# Patient Record
Sex: FEMALE | Race: WHITE
[De-identification: names, ages, dates, MRNs, and addresses within clinical notes are randomized per-mention and may not be internally consistent; named-entity substitution may affect disease eponyms.]

---

## 2017-01-20 ENCOUNTER — HOSPITAL ENCOUNTER (OUTPATIENT)
Dept: HOSPITAL 44 - LAB | Age: 42
End: 2017-01-20
Attending: NURSE PRACTITIONER
Payer: COMMERCIAL

## 2017-01-20 DIAGNOSIS — Z51.81: Primary | ICD-10-CM

## 2017-01-20 DIAGNOSIS — Z79.899: ICD-10-CM

## 2017-01-20 LAB
BASOPHILS NFR BLD: 0.2 % (ref 0–1.5)
EGFR (AFRICAN): > 60
EGFR (NON-AFRICAN): > 60
EOSINOPHIL NFR BLD: 1.1 % (ref 0–6.8)
LYMPHOCYTES # BLD AUTO: 1.2 # K/UL (ref 0.6–4)
MCH RBC QN AUTO: 32.7 PG (ref 28–34)
MONOCYTES #: 0.3 # K/UL (ref 0–0.9)
MONOCYTES %: 7.4 % (ref 0–11)
NEUTROPHILS #: 2.3 # K/UL (ref 1.4–7.7)

## 2017-01-20 PROCEDURE — 80164 ASSAY DIPROPYLACETIC ACD TOT: CPT

## 2017-01-20 PROCEDURE — 85025 COMPLETE CBC W/AUTO DIFF WBC: CPT

## 2017-01-20 PROCEDURE — 80053 COMPREHEN METABOLIC PANEL: CPT

## 2017-01-20 PROCEDURE — 80061 LIPID PANEL: CPT

## 2017-01-20 PROCEDURE — 36415 COLL VENOUS BLD VENIPUNCTURE: CPT

## 2017-02-10 ENCOUNTER — HOSPITAL ENCOUNTER (OUTPATIENT)
Dept: HOSPITAL 44 - LAB | Age: 42
End: 2017-02-10
Attending: NURSE PRACTITIONER
Payer: COMMERCIAL

## 2017-02-10 DIAGNOSIS — Z79.899: ICD-10-CM

## 2017-02-10 DIAGNOSIS — Z51.81: Primary | ICD-10-CM

## 2017-02-10 LAB — VIT B12 SERPL-MCNC: 607 PG/ML (ref 211–946)

## 2017-02-10 PROCEDURE — 36415 COLL VENOUS BLD VENIPUNCTURE: CPT

## 2017-02-10 PROCEDURE — 82746 ASSAY OF FOLIC ACID SERUM: CPT

## 2017-02-10 PROCEDURE — 82608 B-12 BINDING CAPACITY: CPT

## 2017-02-10 PROCEDURE — 84443 ASSAY THYROID STIM HORMONE: CPT

## 2017-03-10 ENCOUNTER — HOSPITAL ENCOUNTER (OUTPATIENT)
Dept: HOSPITAL 44 - LAB | Age: 42
End: 2017-03-10
Attending: NURSE PRACTITIONER
Payer: COMMERCIAL

## 2017-03-10 DIAGNOSIS — Z51.81: Primary | ICD-10-CM

## 2017-03-10 DIAGNOSIS — Z79.899: ICD-10-CM

## 2017-03-10 PROCEDURE — 80171 DRUG SCREEN QUANT GABAPENTIN: CPT

## 2017-03-10 PROCEDURE — 36415 COLL VENOUS BLD VENIPUNCTURE: CPT

## 2017-03-10 PROCEDURE — 82306 VITAMIN D 25 HYDROXY: CPT

## 2017-04-28 ENCOUNTER — HOSPITAL ENCOUNTER (OUTPATIENT)
Dept: HOSPITAL 44 - POD | Age: 42
End: 2017-04-28
Attending: PODIATRIST
Payer: COMMERCIAL

## 2017-04-28 DIAGNOSIS — M79.674: ICD-10-CM

## 2017-04-28 DIAGNOSIS — B35.1: Primary | ICD-10-CM

## 2017-04-28 DIAGNOSIS — M79.675: ICD-10-CM

## 2017-04-28 PROCEDURE — 11721 DEBRIDE NAIL 6 OR MORE: CPT

## 2017-07-14 ENCOUNTER — HOSPITAL ENCOUNTER (OUTPATIENT)
Dept: HOSPITAL 44 - LAB | Age: 42
End: 2017-07-14
Attending: NURSE PRACTITIONER
Payer: COMMERCIAL

## 2017-07-14 DIAGNOSIS — Z51.81: Primary | ICD-10-CM

## 2017-07-14 LAB
EGFR (AFRICAN): > 60
EGFR (NON-AFRICAN): > 60

## 2017-07-14 PROCEDURE — 36415 COLL VENOUS BLD VENIPUNCTURE: CPT

## 2017-07-14 PROCEDURE — 80053 COMPREHEN METABOLIC PANEL: CPT

## 2017-08-01 ENCOUNTER — HOSPITAL ENCOUNTER (OUTPATIENT)
Dept: HOSPITAL 44 - POD | Age: 42
End: 2017-08-01
Attending: PODIATRIST
Payer: COMMERCIAL

## 2017-08-01 DIAGNOSIS — B35.1: Primary | ICD-10-CM

## 2017-08-01 DIAGNOSIS — M79.674: ICD-10-CM

## 2017-08-01 DIAGNOSIS — M79.675: ICD-10-CM

## 2017-08-01 PROCEDURE — 11721 DEBRIDE NAIL 6 OR MORE: CPT

## 2017-09-08 ENCOUNTER — HOSPITAL ENCOUNTER (OUTPATIENT)
Dept: HOSPITAL 44 - LAB | Age: 42
End: 2017-09-08
Attending: NURSE PRACTITIONER
Payer: COMMERCIAL

## 2017-09-08 DIAGNOSIS — Z79.899: Primary | ICD-10-CM

## 2017-09-08 LAB
BASOPHILS NFR BLD: 0.8 % (ref 0–1.5)
EOSINOPHIL NFR BLD: 2.1 % (ref 0–6.8)
LAMOTRIGINE SERPL-MCNC: 6.6 UG/ML (ref 3–15)
MCH RBC QN AUTO: 33.2 PG (ref 28–34)
MCV RBC AUTO: 99.4 FL (ref 80–100)
MONOCYTES %: 6.1 % (ref 0–11)
NEUTROPHILS #: 2.8 # K/UL (ref 1.4–7.7)
VALPROATE SERPL-MCNC: 87.5 UG/ML (ref 50–100)

## 2017-09-08 PROCEDURE — 80164 ASSAY DIPROPYLACETIC ACD TOT: CPT

## 2017-09-08 PROCEDURE — 80171 DRUG SCREEN QUANT GABAPENTIN: CPT

## 2017-09-08 PROCEDURE — 85025 COMPLETE CBC W/AUTO DIFF WBC: CPT

## 2017-09-08 PROCEDURE — 36415 COLL VENOUS BLD VENIPUNCTURE: CPT

## 2017-10-17 ENCOUNTER — HOSPITAL ENCOUNTER (OUTPATIENT)
Dept: HOSPITAL 44 - POD | Age: 42
End: 2017-10-17
Attending: PODIATRIST
Payer: COMMERCIAL

## 2017-10-17 DIAGNOSIS — B35.1: Primary | ICD-10-CM

## 2017-10-17 DIAGNOSIS — M79.674: ICD-10-CM

## 2017-10-17 DIAGNOSIS — M79.675: ICD-10-CM

## 2017-10-17 PROCEDURE — 11721 DEBRIDE NAIL 6 OR MORE: CPT

## 2017-10-31 ENCOUNTER — HOSPITAL ENCOUNTER (OUTPATIENT)
Dept: HOSPITAL 44 - LAB | Age: 42
End: 2017-10-31
Attending: SPECIALIST
Payer: COMMERCIAL

## 2017-10-31 DIAGNOSIS — Z79.899: Primary | ICD-10-CM

## 2017-10-31 DIAGNOSIS — Q03.9: ICD-10-CM

## 2017-10-31 LAB
BASOPHILS NFR BLD: 0.5 % (ref 0–1.5)
EGFR (AFRICAN): > 60
EGFR (NON-AFRICAN): > 60
EOSINOPHIL NFR BLD: 1.8 % (ref 0–6.8)
LAMOTRIGINE SERPL-MCNC: 7.3 UG/ML (ref 3–15)
MCH RBC QN AUTO: 32.8 PG (ref 28–34)
MCV RBC AUTO: 99.7 FL (ref 80–100)
MONOCYTES %: 5.6 % (ref 0–11)
NEUTROPHILS #: 2.4 # K/UL (ref 1.4–7.7)
VALPROATE SERPL-MCNC: 73.9 UG/ML (ref 50–100)

## 2017-10-31 PROCEDURE — 80053 COMPREHEN METABOLIC PANEL: CPT

## 2017-10-31 PROCEDURE — 80164 ASSAY DIPROPYLACETIC ACD TOT: CPT

## 2017-10-31 PROCEDURE — 85025 COMPLETE CBC W/AUTO DIFF WBC: CPT

## 2017-10-31 PROCEDURE — 80171 DRUG SCREEN QUANT GABAPENTIN: CPT

## 2017-10-31 PROCEDURE — 36415 COLL VENOUS BLD VENIPUNCTURE: CPT

## 2018-01-12 ENCOUNTER — HOSPITAL ENCOUNTER (OUTPATIENT)
Dept: HOSPITAL 44 - POD | Age: 43
End: 2018-01-12
Attending: PODIATRIST
Payer: COMMERCIAL

## 2018-01-12 DIAGNOSIS — M79.675: ICD-10-CM

## 2018-01-12 DIAGNOSIS — M79.674: Primary | ICD-10-CM

## 2018-01-12 PROCEDURE — 11721 DEBRIDE NAIL 6 OR MORE: CPT

## 2018-04-13 ENCOUNTER — HOSPITAL ENCOUNTER (OUTPATIENT)
Dept: HOSPITAL 44 - POD | Age: 43
End: 2018-04-13
Attending: PODIATRIST
Payer: COMMERCIAL

## 2018-04-13 DIAGNOSIS — M79.675: ICD-10-CM

## 2018-04-13 DIAGNOSIS — B35.1: Primary | ICD-10-CM

## 2018-04-13 DIAGNOSIS — M79.674: ICD-10-CM

## 2018-04-13 PROCEDURE — 11721 DEBRIDE NAIL 6 OR MORE: CPT

## 2018-04-17 ENCOUNTER — HOSPITAL ENCOUNTER (OUTPATIENT)
Dept: HOSPITAL 44 - LAB | Age: 43
End: 2018-04-17
Attending: PSYCHIATRY & NEUROLOGY
Payer: COMMERCIAL

## 2018-04-17 DIAGNOSIS — Z79.899: Primary | ICD-10-CM

## 2018-04-17 LAB
BASOPHILS NFR BLD: 0.2 % (ref 0–1.5)
EGFR (AFRICAN): > 60
EGFR (NON-AFRICAN): > 60
EOSINOPHIL NFR BLD: 1.3 % (ref 0–6.8)
MCH RBC QN AUTO: 32.8 PG (ref 28–34)
MCV RBC AUTO: 102.1 FL (ref 80–100)
MONOCYTES %: 5 % (ref 0–11)
NEUTROPHILS #: 2.3 # K/UL (ref 1.4–7.7)

## 2018-04-17 PROCEDURE — 80053 COMPREHEN METABOLIC PANEL: CPT

## 2018-04-17 PROCEDURE — 80061 LIPID PANEL: CPT

## 2018-04-17 PROCEDURE — 82306 VITAMIN D 25 HYDROXY: CPT

## 2018-04-17 PROCEDURE — 36415 COLL VENOUS BLD VENIPUNCTURE: CPT

## 2018-04-17 PROCEDURE — 80164 ASSAY DIPROPYLACETIC ACD TOT: CPT

## 2018-04-17 PROCEDURE — 85025 COMPLETE CBC W/AUTO DIFF WBC: CPT

## 2018-07-13 ENCOUNTER — HOSPITAL ENCOUNTER (OUTPATIENT)
Dept: HOSPITAL 44 - LAB | Age: 43
End: 2018-07-13
Attending: SPECIALIST
Payer: COMMERCIAL

## 2018-07-13 DIAGNOSIS — G40.89: Primary | ICD-10-CM

## 2018-07-13 PROCEDURE — 36415 COLL VENOUS BLD VENIPUNCTURE: CPT

## 2018-07-13 PROCEDURE — 80164 ASSAY DIPROPYLACETIC ACD TOT: CPT

## 2018-07-17 ENCOUNTER — HOSPITAL ENCOUNTER (OUTPATIENT)
Dept: HOSPITAL 44 - POD | Age: 43
End: 2018-07-17
Attending: PODIATRIST
Payer: COMMERCIAL

## 2018-07-17 DIAGNOSIS — M79.674: ICD-10-CM

## 2018-07-17 DIAGNOSIS — B35.1: Primary | ICD-10-CM

## 2018-07-17 DIAGNOSIS — M79.675: ICD-10-CM

## 2018-07-17 PROCEDURE — 11721 DEBRIDE NAIL 6 OR MORE: CPT

## 2018-08-10 ENCOUNTER — HOSPITAL ENCOUNTER (OUTPATIENT)
Dept: HOSPITAL 44 - LAB | Age: 43
End: 2018-08-10
Attending: NURSE PRACTITIONER
Payer: COMMERCIAL

## 2018-08-10 DIAGNOSIS — Z79.899: ICD-10-CM

## 2018-08-10 DIAGNOSIS — Z51.81: Primary | ICD-10-CM

## 2018-08-10 PROCEDURE — 80164 ASSAY DIPROPYLACETIC ACD TOT: CPT

## 2018-08-10 PROCEDURE — 36415 COLL VENOUS BLD VENIPUNCTURE: CPT

## 2018-10-16 ENCOUNTER — HOSPITAL ENCOUNTER (OUTPATIENT)
Dept: HOSPITAL 44 - LAB | Age: 43
End: 2018-10-16
Attending: PSYCHIATRY & NEUROLOGY
Payer: COMMERCIAL

## 2018-10-16 DIAGNOSIS — Z51.81: Primary | ICD-10-CM

## 2018-10-16 DIAGNOSIS — Z79.899: ICD-10-CM

## 2018-10-16 LAB
BASOPHILS NFR BLD: 0.3 % (ref 0–1.5)
EGFR (NON-AFRICAN): > 60
EOSINOPHIL NFR BLD: 2 % (ref 0–6.8)
MCH RBC QN AUTO: 32.6 PG (ref 28–34)
MCV RBC AUTO: 99 FL (ref 80–100)
MONOCYTES %: 7 % (ref 0–11)
NEUTROPHILS #: 2.3 # K/UL (ref 1.4–7.7)

## 2018-10-16 PROCEDURE — 80053 COMPREHEN METABOLIC PANEL: CPT

## 2018-10-16 PROCEDURE — 80164 ASSAY DIPROPYLACETIC ACD TOT: CPT

## 2018-10-16 PROCEDURE — 85025 COMPLETE CBC W/AUTO DIFF WBC: CPT

## 2018-10-16 PROCEDURE — 36415 COLL VENOUS BLD VENIPUNCTURE: CPT

## 2019-11-15 ENCOUNTER — HOSPITAL ENCOUNTER (OUTPATIENT)
Dept: HOSPITAL 44 - RAD | Age: 44
End: 2019-11-15
Attending: NURSE PRACTITIONER
Payer: COMMERCIAL

## 2019-11-15 DIAGNOSIS — Z01.411: Primary | ICD-10-CM

## 2019-11-15 DIAGNOSIS — Z79.899: ICD-10-CM

## 2019-11-15 DIAGNOSIS — Z51.81: ICD-10-CM

## 2019-11-15 PROCEDURE — 76830 TRANSVAGINAL US NON-OB: CPT

## 2019-11-15 NOTE — DIAGNOSTIC IMAGING REPORT
PATIENT MR#:   [id number]

PATIENT ACCT#: [id number]

PATIENT NAME:  ALEKSANDR GARRIDO

YOB: 1975

REFERRING PHYSICIAN: Melinda Harris

EXAM DATE:        11/15/2019

ACCESSION NUMBER: [id number]

EXAM DESCRIPTION: US TRANSVAGINAL PELVIS





CLINICAL HISTORY: ABNORMAL PELVIC EXAM, LONG TERM USE OF HIGH RISK MEDICATION (TAMOXIFEN)



TECHNIQUE: Transvaginal ultrasound of the pelvis was performed. 





ULTRASOUND PELVIS:



Uterus: 8.5 x 4.8 x 6.8 cm. Normal without masses.  9 mm septated Nabothian cyst within the cervix.



Endometrial stripe: 5 mm thickness. 



Right ovary: Not visualized via the transvaginal approach. 



Left ovary: 5.0 x 2.9 x 2.9 cm. Normal ovarian vascular flow. Complex cyst measuring 2.2 cm. 



Pelvic fluid: Physiologic.





IMPRESSION:

1. 2.2 cm left ovarian cyst. Recommend follow-up ultrasound in 6-8 weeks to confirm resolution.

2. Nonvisualization of the right ovary which may be due to bowel gas or high positioning.

3. Small amount of simple free pelvic fluid, which may be physiologic.



Read by:        Dr. Orlando Rodríguez

Transcribed by: Orlando Rodríguez

Transcribed Date: 11/15/2019 4:41:15 PM

Electronically signed by: Dr. Orlando Rodríguez

Date signed: 11/15/2019 4:41:53 PM